# Patient Record
Sex: FEMALE | Race: WHITE | URBAN - METROPOLITAN AREA
[De-identification: names, ages, dates, MRNs, and addresses within clinical notes are randomized per-mention and may not be internally consistent; named-entity substitution may affect disease eponyms.]

---

## 2024-01-08 ENCOUNTER — APPOINTMENT (OUTPATIENT)
Dept: URBAN - METROPOLITAN AREA CLINIC 240 | Age: 49
Setting detail: DERMATOLOGY
End: 2024-01-08

## 2024-01-08 DIAGNOSIS — D49.2 NEOPLASM OF UNSPECIFIED BEHAVIOR OF BONE, SOFT TISSUE, AND SKIN: ICD-10-CM

## 2024-01-08 PROCEDURE — OTHER MIPS QUALITY: OTHER

## 2024-01-08 PROCEDURE — 99202 OFFICE O/P NEW SF 15 MIN: CPT

## 2024-01-08 PROCEDURE — OTHER ADDITIONAL NOTES: OTHER

## 2024-01-08 PROCEDURE — OTHER DEFER: OTHER

## 2024-01-08 ASSESSMENT — LOCATION SIMPLE DESCRIPTION DERM: LOCATION SIMPLE: NOSE

## 2024-01-08 ASSESSMENT — LOCATION DETAILED DESCRIPTION DERM: LOCATION DETAILED: LEFT NASAL DORSUM

## 2024-01-08 ASSESSMENT — LOCATION ZONE DERM: LOCATION ZONE: NOSE

## 2024-01-08 NOTE — PROCEDURE: ADDITIONAL NOTES
Detail Level: Simple
Render Risk Assessment In Note?: no
Additional Notes: per patient, this lesion was biopsied at [facility] dermatology and patient was told it is a \"basal cell\". patient does not have any pathology records on hand and no records were sent to us. I encouraged patient to acquire the pathology reports and to also see a mohs surgeon for this, as excision is not recommended for skin cancers on the nose. patient is leaving for [place] in 2 weeks and hopes to get this done beforehand if possible. I shared the contact info for mohs surgeons in [place], [place], [place], [place] and [place], [place] to see if patient can get in to any of these ASAP. however, if this is a bcc, then it would be OK to wait 2-3 months. would not recommend waiting for SCC or melanoma or other skin malignancy, though patient does not have path reports for me to be sure.

## 2024-01-08 NOTE — PROCEDURE: DEFER
Procedure To Be Performed At Next Visit: Mohs surgery
Introduction Text (Please End With A Colon): The following procedure was deferred:
Detail Level: Detailed
Size Of Lesion In Cm (Optional): 0.3
Instructions (Optional): Due to this lesion being on the Nasal Sidewall, the procedure cannot be performed today. Discussed with patient in detail that lesions on the nose will require Mohs to preserve the tissue. Patient expressed understanding. Provided patient Mohs referral information.
X Size Of Lesion In Cm (Optional): 0

## 2024-01-08 NOTE — HPI: SKIN LESION
What Type Of Note Output Would You Prefer (Optional)?: Standard Output
How Severe Is Your Skin Lesion?: mild
Has Your Skin Lesion Been Treated?: not been treated
Is This A New Presentation, Or A Follow-Up?: Skin Lesion
Additional History: Patient was seen at [facility] and this lesion was biopsied. Patient states that this lesion came back as a BCC (no path report today at visit) and that she would like the lesion to be excised by Dr. Crowe before she returns home to [place] next month. Patient states that [facility] did not have any available excisions until march.